# Patient Record
Sex: FEMALE | Race: BLACK OR AFRICAN AMERICAN
[De-identification: names, ages, dates, MRNs, and addresses within clinical notes are randomized per-mention and may not be internally consistent; named-entity substitution may affect disease eponyms.]

---

## 2023-01-25 PROBLEM — Z00.00 ENCOUNTER FOR PREVENTIVE HEALTH EXAMINATION: Status: ACTIVE | Noted: 2023-01-25

## 2023-02-07 ENCOUNTER — NON-APPOINTMENT (OUTPATIENT)
Age: 53
End: 2023-02-07

## 2023-02-10 NOTE — HISTORY OF PRESENT ILLNESS
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 Patient started experincing R blood nipple dischage??? Send to B/l MG/US 12/2022 yielding negative findings and recommended MRI for further evaluation.  She was referred by..... She has hx of..... Fhx of breast cancer in maternal grandmother (age 55)??? Genetics??? Patient denies palpable masses, skin changes, or nipple discharge bilaterally???\par \par DARIO Lifetime Risk- ???\par \par 3/7/22: B/l MG ([facility])- scattered fibroglandular. WANDA. BI-RADS 1\par 12/1/22: R MG & US (bloody nipple discharge)- scattered fibroglandular. 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 US- R mild ductal ectasia. Rec MRI for further eval. BI-RADS 0\par 12/19/22: MRI ([facility])- R 2.3cm linear NME in ductal distribution UIQ 4FN (rec MR bx). R limited eval of nipple areolar complex due to breast size. L WANDA. BI-RADS 4\par 1/16/23: R MR bx UIQ (cylinder clip)- IDP w/ calcs. High risk and concordant- rec surgical consult

## 2023-02-10 NOTE — REVIEW OF SYSTEMS
[Fever] : no fever [Chills] : no chills [Shortness Of Breath] : no shortness of breath [Cough] : cough [Skin Lesions] : no skin lesions [Skin Wound] : no skin wound

## 2023-02-10 NOTE — PHYSICAL EXAM
[Normocephalic] : normocephalic [EOMI] : extra ocular movement intact [Supple] : supple [No Supraclavicular Adenopathy] : no supraclavicular adenopathy [No Cervical Adenopathy] : no cervical adenopathy [de-identified] : R discharge??? [de-identified] : L breast/axilla/supraclavicular area: No masses, discharge, or adenopathy\par

## 2023-02-16 ENCOUNTER — APPOINTMENT (OUTPATIENT)
Dept: BREAST CENTER | Facility: CLINIC | Age: 53
End: 2023-02-16